# Patient Record
Sex: MALE | Race: BLACK OR AFRICAN AMERICAN | NOT HISPANIC OR LATINO | URBAN - METROPOLITAN AREA
[De-identification: names, ages, dates, MRNs, and addresses within clinical notes are randomized per-mention and may not be internally consistent; named-entity substitution may affect disease eponyms.]

---

## 2018-04-29 ENCOUNTER — EMERGENCY (EMERGENCY)
Facility: HOSPITAL | Age: 38
LOS: 1 days | Discharge: HOME | End: 2018-04-29
Attending: EMERGENCY MEDICINE | Admitting: EMERGENCY MEDICINE
Payer: SELF-PAY

## 2018-04-29 VITALS
OXYGEN SATURATION: 100 % | TEMPERATURE: 98 F | HEART RATE: 60 BPM | SYSTOLIC BLOOD PRESSURE: 119 MMHG | RESPIRATION RATE: 19 BRPM | DIASTOLIC BLOOD PRESSURE: 77 MMHG

## 2018-04-29 VITALS — WEIGHT: 145.06 LBS | HEIGHT: 66 IN

## 2018-04-29 PROCEDURE — 99283 EMERGENCY DEPT VISIT LOW MDM: CPT

## 2018-04-29 PROCEDURE — 73030 X-RAY EXAM OF SHOULDER: CPT | Mod: 26,LT

## 2018-04-29 PROCEDURE — 73030 X-RAY EXAM OF SHOULDER: CPT

## 2018-04-29 RX ORDER — IBUPROFEN 200 MG
600 TABLET ORAL ONCE
Qty: 0 | Refills: 0 | Status: DISCONTINUED | OUTPATIENT
Start: 2018-04-29 | End: 2018-05-04

## 2018-04-29 NOTE — ED PROVIDER NOTE - ATTENDING CONTRIBUTION TO CARE
37 year old c/o dislocated left shoulder.  He reported to me that he had jumped a fence when the incident occurred but he was able to reduce it on his own.  He has history of shoulder dislocation x 2 but has never followed up with Orthopedics.  Currently in the ER patient able to cross midline, no deformity noted.  Xray shows no dislocation or fracture.  He was instructed to take tylenol or motrin if he has pain and to follow-up with Orthopedics.

## 2018-04-29 NOTE — ED ADULT NURSE NOTE - OBJECTIVE STATEMENT
37 yom presents to ed complaining of left shoulder displacement airway intact lung sounds clear equal bilaterally abd soft nontender moves all extremities. decreased range of motion noted left arm only.

## 2018-04-29 NOTE — ED PROVIDER NOTE - OBJECTIVE STATEMENT
36 yo M presented to ED with c/o left shoulder pain. Pt states that he was playing basketball yesterday and dislocated his shoulder- but went back in. pt reports that that has happened before in the past x 1. Pt reports pain with raising the arm today. No defor. NO weakness or paresthesias

## 2023-07-13 NOTE — ED PROCEDURE NOTE - NS ED PROCEDURE ASSISTED BY
Is This Lesion Biopsy Proven?: No
How Severe Are They?: moderate
Is This A New Presentation, Or A Follow-Up?: Actinic Keratosis
The procedure was performed independently

## 2023-09-07 NOTE — ED ADULT TRIAGE NOTE - NS ED NURSE BANDS TYPE
Name band; APPTS ARE READY TO BE MADE: [ ] YES    Best Family or Patient Contact (if needed):    Additional Information about above appointments (if needed):    1: PCP  2: Neurology  3:     Other comments or requests:          CTAP IVC prominent cluster of right lower quadrant nodes of unclear etiology,   - advised to have outpatient repeat CT abd/pelvis after treatment of UTI in a month  ** spoke to family member at bedside.

## 2025-04-15 NOTE — ED PROVIDER NOTE - CARDIAC, MLM
PATIENT STATED HE IS GIVING A LOT OF FOOT PAIN NAD BLOOD PRESSURE IS STARTING TO RAISE TRANSFERRED HIM TO [place]   
Normal rate, regular rhythm.  Heart sounds S1, S2.  No murmurs, rubs or gallops.